# Patient Record
Sex: MALE | Race: AMERICAN INDIAN OR ALASKA NATIVE
[De-identification: names, ages, dates, MRNs, and addresses within clinical notes are randomized per-mention and may not be internally consistent; named-entity substitution may affect disease eponyms.]

---

## 2021-12-24 ENCOUNTER — HOSPITAL ENCOUNTER (EMERGENCY)
Dept: HOSPITAL 5 - ED | Age: 46
Discharge: HOME | End: 2021-12-24
Payer: SELF-PAY

## 2021-12-24 DIAGNOSIS — J01.90: ICD-10-CM

## 2021-12-24 DIAGNOSIS — B96.89: ICD-10-CM

## 2021-12-24 DIAGNOSIS — J30.2: ICD-10-CM

## 2021-12-24 DIAGNOSIS — Z79.899: ICD-10-CM

## 2021-12-24 DIAGNOSIS — F17.200: ICD-10-CM

## 2021-12-24 DIAGNOSIS — J06.9: Primary | ICD-10-CM

## 2021-12-24 DIAGNOSIS — Z72.89: ICD-10-CM

## 2021-12-24 LAB
ALBUMIN SERPL-MCNC: 4.6 G/DL (ref 3.9–5)
ALT SERPL-CCNC: 23 UNITS/L (ref 7–56)
BASOPHILS # (AUTO): 0 K/MM3 (ref 0–0.1)
BASOPHILS NFR BLD AUTO: 0.1 % (ref 0–1.8)
BUN SERPL-MCNC: 13 MG/DL (ref 9–20)
BUN/CREAT SERPL: 13 %
CALCIUM SERPL-MCNC: 9.3 MG/DL (ref 8.4–10.2)
EOSINOPHIL # BLD AUTO: 0.2 K/MM3 (ref 0–0.4)
EOSINOPHIL NFR BLD AUTO: 2.7 % (ref 0–4.3)
HCT VFR BLD CALC: 42.3 % (ref 35.5–45.6)
HEMOLYSIS INDEX: 5
HGB BLD-MCNC: 13.7 GM/DL (ref 11.8–15.2)
LYMPHOCYTES # BLD AUTO: 2.5 K/MM3 (ref 1.2–5.4)
LYMPHOCYTES NFR BLD AUTO: 43.5 % (ref 13.4–35)
MCHC RBC AUTO-ENTMCNC: 32 % (ref 32–34)
MCV RBC AUTO: 86 FL (ref 84–94)
MONOCYTES # (AUTO): 0.4 K/MM3 (ref 0–0.8)
MONOCYTES % (AUTO): 7.5 % (ref 0–7.3)
PLATELET # BLD: 157 K/MM3 (ref 140–440)
RBC # BLD AUTO: 4.94 M/MM3 (ref 3.65–5.03)

## 2021-12-24 PROCEDURE — 85025 COMPLETE CBC W/AUTO DIFF WBC: CPT

## 2021-12-24 PROCEDURE — 71046 X-RAY EXAM CHEST 2 VIEWS: CPT

## 2021-12-24 PROCEDURE — 99283 EMERGENCY DEPT VISIT LOW MDM: CPT

## 2021-12-24 PROCEDURE — 80053 COMPREHEN METABOLIC PANEL: CPT

## 2021-12-24 PROCEDURE — 36415 COLL VENOUS BLD VENIPUNCTURE: CPT

## 2021-12-24 NOTE — XRAY REPORT
CHEST 2 VIEWS 



INDICATION / CLINICAL INFORMATION:

COUGH PERSISTANT X WEEK.



COMPARISON: 

None available.



FINDINGS:



SUPPORT DEVICES: None.

HEART / MEDIASTINUM: No significant abnormality. 

LUNGS / PLEURA: No significant pulmonary or pleural abnormality. No pneumothorax. 



ADDITIONAL FINDINGS: No significant additional findings.



IMPRESSION:

1. No acute findings.



Signer Name: Yves Alcocer MD 

Signed: 12/24/2021 9:59 PM

Workstation Name: [id number]

## 2021-12-25 VITALS — DIASTOLIC BLOOD PRESSURE: 91 MMHG | SYSTOLIC BLOOD PRESSURE: 131 MMHG

## 2021-12-25 NOTE — EMERGENCY DEPARTMENT REPORT
- General


Chief Complaint: Upper Respiratory Infection


Stated Complaint: COVID/FLU SYMPTOMS


Source: patient


Mode of arrival: Ambulatory


Limitations: No Limitations





- History of Present Illness


Initial Comments: 





Patient is a 46-year-old -American male with no past medical history 

presented to the ED with complaint of acute onset persistent nasal and sinus 

congestion, persistent dry cough for the last 2 weeks.  Patient states that 

symptoms have worsened in the last 6 days.  Patient states that he has been 

taking over-the-counter medication with no relief.  Patient denies fever, 

chills, dizziness, syncope, chest pain, shortness of breath, abdominal pain, 

change in vision, diarrhea, sore throat, headache or change in vision.  


MD Complaint: cough, rhinorrhea, nasal congestion, sinus pain


-: Sudden, week(s) (2)


Severity: moderate


Severity scale (0 -10): 4


Quality: dull, aching


Consistency: constant


Improves With: nothing


Worsens With: nothing


Associated Symptoms: denies other symptoms, headache, rhinorrhea, nasal 

congestion, cough.  denies: fever, chills, myalgias, diaphoresis, sore throat, 

stiff neck, chest pain, shortness of breath, abdominal pain, nausea, diarrhea, 

dysuria, rash, confusion, right sweats, weight loss, epistaxis, hoarseness, ear 

pain


Treatments Prior to Arrival: "cold medicine"





- Related Data


                                  Previous Rx's











 Medication  Instructions  Recorded  Last Taken  Type


 


Sulfamethoxazole/Trimethoprim 1 each PO BID #14 tablet 14 Unknown Rx





[Bactrim Ds]    


 


traMADoL [Ultram] 50 mg PO Q4HR PRN #15 tablet 14 Unknown Rx


 


Benzonatate [Tessalon Perles] 100 mg PO Q8HR #30 capsule 21 Unknown Rx


 


Cetirizine HCl [Zyrtec 10mg tab] 10 mg PO DAILY #30 tablet 21 Unknown Rx


 


Doxycycline Hyclate 100 mg PO Q12H #20 capsule 21 Unknown Rx


 


methylPREDNISolone [Medrol 4MG 4 mg PO DAILY #21 tab.ds.pk 21 Unknown Rx





DOSEPAK (21 tabs)]    











                                    Allergies











Allergy/AdvReac Type Severity Reaction Status Date / Time


 


No Known Allergies Allergy   Unverified 14 18:34














ED Review of Systems


ROS: 


Stated complaint: COVID/FLU SYMPTOMS


Other details as noted in HPI





Constitutional: denies: chills, fever


Eyes: denies: eye pain, eye discharge, vision change


ENT: congestion.  denies: ear pain, throat pain


Respiratory: cough.  denies: shortness of breath, wheezing


Cardiovascular: denies: chest pain, palpitations


Endocrine: no symptoms reported


Gastrointestinal: denies: abdominal pain, nausea, diarrhea


Genitourinary: denies: urgency, dysuria


Musculoskeletal: denies: back pain, joint swelling, arthralgia


Skin: denies: rash, lesions


Neurological: denies: headache, weakness, paresthesias


Psychiatric: denies: anxiety, depression


Hematological/Lymphatic: denies: easy bleeding, easy bruising





ED Past Medical Hx





- Past Medical History


Previous Medical History?: No


Additional medical history: sciatica x 1 yr





- Surgical History


Past Surgical History?: No





- Social History


Smoking Status: Current Every Day Smoker


Substance Use Type: Alcohol





- Medications


Home Medications: 


                                Home Medications











 Medication  Instructions  Recorded  Confirmed  Last Taken  Type


 


Sulfamethoxazole/Trimethoprim 1 each PO BID #14 tablet 14  Unknown Rx





[Bactrim Ds]     


 


traMADoL [Ultram] 50 mg PO Q4HR PRN #15 tablet 14  Unknown Rx


 


Benzonatate [Tessalon Perles] 100 mg PO Q8HR #30 capsule 21  Unknown Rx


 


Cetirizine HCl [Zyrtec 10mg tab] 10 mg PO DAILY #30 tablet 21  Unknown Rx


 


Doxycycline Hyclate 100 mg PO Q12H #20 capsule 21  Unknown Rx


 


methylPREDNISolone [Medrol 4MG 4 mg PO DAILY #21 tab.ds.pk 21  Unknown Rx





DOSEPAK (21 tabs)]     














ED Physical Exam





- General


Limitations: No Limitations


General appearance: alert, in no apparent distress





- Head


Head exam: Present: atraumatic, normocephalic, normal inspection





- Eye


Eye exam: Present: normal appearance, PERRL, EOMI


Pupils: Present: normal accommodation





- ENT


ENT exam: Present: normal orophraynx, mucous membranes moist, TM's normal 

bilaterally, normal external ear exam, other (Grossly congested nasal passages; 

palpable frontal and maxillary sinus tenderness)





- Neck


Neck exam: Present: normal inspection, full ROM.  Absent: tenderness





- Respiratory


Respiratory exam: Present: normal lung sounds bilaterally.  Absent: respiratory 

distress, wheezes, rales, rhonchi, chest wall tenderness, accessory muscle use, 

decreased breath sounds, prolonged expiratory





- Cardiovascular


Cardiovascular Exam: Present: regular rate, normal rhythm, normal heart sounds. 

Absent: systolic murmur, diastolic murmur, rubs, gallop





- GI/Abdominal


GI/Abdominal exam: Present: soft, normal bowel sounds.  Absent: tenderness, 

guarding, rebound, hyperactive bowel sounds, hypoactive bowel sounds, 

organomegaly





- Extremities Exam


Extremities exam: Present: normal inspection, full ROM, normal capillary refill





- Back Exam


Back exam: Present: normal inspection, full ROM.  Absent: tenderness, CVA ten

derness (R), CVA tenderness (L), muscle spasm, paraspinal tenderness





- Neurological Exam


Neurological exam: Present: alert, oriented X3, CN II-XII intact, normal gait, 

reflexes normal





- Psychiatric


Psychiatric exam: Present: normal affect, normal mood





- Skin


Skin exam: Present: warm, dry, intact, normal color.  Absent: rash





ED Course





                                   Vital Signs











  21





  21:35


 


Temperature 97.6 F


 


Pulse Rate 74


 


Respiratory 16





Rate 


 


Blood Pressure 135/80





[Left] 


 


O2 Sat by Pulse 98





Oximetry 














ED Medical Decision Making





- Lab Data


Result diagrams: 


                                 21 21:41





                                 21 21:41





- Radiology Data


Radiology results: report reviewed, image [place]





[place] [address]  


 


                                            XRay Report   


                                               Signed  


 


Patient: PEDRITO BATES                                                          

      MR#: [id number]  


1          


: 1975                                                                

Acct:[id number]      


 


Age/Sex: 46 / M                                                                

ADM Date: 21     


 


Loc: ED       


Attending Dr:   


 


 


Ordering Physician: ED MD TONEY  


Date of Service: 21  


Procedure(s): XR chest routine 2V  


Accession Number(s): [id number]  


 


cc: ED MD TONEY   


 


Fluoro Time In Minutes:   


 


CHEST 2 VIEWS   


 


 INDICATION / CLINICAL INFORMATION:  


 COUGH PERSISTANT X WEEK.  


 


 COMPARISON:   


 None available.  


 


 FINDINGS:  


 


 SUPPORT DEVICES: None.  


 HEART / MEDIASTINUM: No significant abnormality.   


 LUNGS / PLEURA: No significant pulmonary or pleural abnormality. No 

pneumothorax.   


 


 ADDITIONAL FINDINGS: No significant additional findings.  


 


 IMPRESSION:  


 1. No acute findings.  


 


 Signer Name: Yves Alcocer MD   


 Signed: 2021 9:59 PM  


 Workstation Name: [id number]   


 


 


Transcribed By: TL  


Dictated By: Yves Alcocer MD  


Electronically Authenticated By: Yves Alcocer MD    


Signed Date/Time: 21                                


 


 


 


DD/DT: 21                                                            

  


TD/TT:








- Medical Decision Making





This is a 46-year-old -American male with no past medical history 

presented to the ED with complaint of acute onset persistent nasal and sinus 

congestion, persistent dry cough for the last 2 weeks.  Patient states that 

symptoms have worsened in the last 6 days.  Patient states that he has been 

taking over-the-counter medication with no relief.  In the ED, patient is alert 

and oriented x3 and is not in any distress.  Patient is hemodynamically stable. 

 Chest x-ray showed no acute cardiopulmonary abnormalities or pneumonitis.  Lab 

test results were reviewed and are all nonactionable.  Patient was therefore 

discharged home on medications and advised to follow-up with his primary care 

physician in 7 to 10 days for reevaluation or return to the ED immediately if 

symptoms get worse.





- Differential Diagnosis


URI; sinusitis; bronchitis; pneumonia; allergic rhinitis


Critical care attestation.: 


If time is entered above; I have spent that time in minutes in the direct care 

of this critically ill patient, excluding procedure time.








ED Disposition


Clinical Impression: 


 Acute upper respiratory infection, Acute bacterial sinusitis





Acute bronchitis


Qualifiers:


 Bronchitis organism: unspecified organism Qualified Code(s): J20.9 - Acute 

bronchitis, unspecified





Allergic rhinitis


Qualifiers:


 Allergic rhinitis trigger: unspecified Allergic rhinitis seasonality: seasonal 

Qualified Code(s): J30.2 - Other seasonal allergic rhinitis





Disposition: 01 HOME / SELF CARE / HOMELESS


Is pt being admited?: No


Does the pt Need Aspirin: No


Condition: Stable


Instructions:  Acute Bronchitis (ED), Sinusitis, Adult, Easy-to-Read, Upper 

Respiratory Infection, Adult, Easy-to-Read, Allergic Rhinitis, Adult, 

Easy-to-Read, Acute Bronchitis, Adult, Easy-to-Read, Cough, Adult, Easy-to-Read


Additional Instructions: 


All lab test results were reviewed and are all nonactionable.  Chest x-ray 

showed no acute cardiopulmonary abnormalities or pneumonitis.  Your symptoms are

 likely due to acute upper respiratory infection versus sinusitis versus 

allergic rhinitis or bronchitis.  Therefore take medications with food, drink 

plenty of fluids and follow-up with your primary care physician in 7 to 10 days 

for reevaluation.  Return to the ED immediately if symptoms get worse.


Prescriptions: 


Doxycycline Hyclate 100 mg PO Q12H #20 capsule


methylPREDNISolone [Medrol 4MG DOSEPAK (21 tabs)] 4 mg PO DAILY #21 tab.ds.pk


Benzonatate [Tessalon Perles] 100 mg PO Q8HR #30 capsule


Cetirizine HCl [Zyrtec 10mg tab] 10 mg PO DAILY #30 tablet


Referrals: 


[facility] [Provider Group] - 7-10 days


Time of Disposition: 00:14


Print Language: ENGLISH